# Patient Record
Sex: MALE | Race: WHITE | Employment: PART TIME | URBAN - METROPOLITAN AREA
[De-identification: names, ages, dates, MRNs, and addresses within clinical notes are randomized per-mention and may not be internally consistent; named-entity substitution may affect disease eponyms.]

---
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
## 2023-08-31 PROBLEM — F32.1 MODERATE SINGLE CURRENT EPISODE OF MAJOR DEPRESSIVE DISORDER (MULTI): Status: ACTIVE | Noted: 2023-08-31

## 2023-08-31 PROBLEM — F41.1 GENERALIZED ANXIETY DISORDER: Status: ACTIVE | Noted: 2023-08-31

## 2023-08-31 PROBLEM — F95.9 TIC DISORDER: Status: ACTIVE | Noted: 2023-08-31

## 2023-08-31 PROBLEM — F90.2 ATTENTION DEFICIT HYPERACTIVITY DISORDER (ADHD), COMBINED TYPE: Status: ACTIVE | Noted: 2023-08-31

## 2023-08-31 RX ORDER — FLUOXETINE 10 MG/1
TABLET ORAL DAILY
COMMUNITY
Start: 2017-07-31 | End: 2023-10-09 | Stop reason: SDUPTHER

## 2023-08-31 RX ORDER — GUANFACINE 2 MG/1
1 TABLET, EXTENDED RELEASE ORAL DAILY
COMMUNITY
Start: 2022-08-26 | End: 2023-10-09 | Stop reason: SDUPTHER

## 2023-09-05 ENCOUNTER — HOSPITAL ENCOUNTER (EMERGENCY)
Age: 18
Discharge: HOME | End: 2023-09-05
Attending: EMERGENCY MEDICINE
Payer: COMMERCIAL

## 2023-09-05 VITALS
TEMPERATURE: 98.2 F | HEIGHT: 78 IN | DIASTOLIC BLOOD PRESSURE: 61 MMHG | HEART RATE: 84 BPM | SYSTOLIC BLOOD PRESSURE: 106 MMHG | BODY MASS INDEX: 27.88 KG/M2 | WEIGHT: 241 LBS | OXYGEN SATURATION: 100 % | RESPIRATION RATE: 18 BRPM

## 2023-09-05 DIAGNOSIS — R42 DIZZINESS: Primary | ICD-10-CM

## 2023-09-05 LAB
CHP ED QC CHECK: NORMAL
GLUCOSE BLD-MCNC: 94 MG/DL
GLUCOSE BLD-MCNC: 94 MG/DL (ref 70–99)
PERFORMED ON: NORMAL

## 2023-09-05 PROCEDURE — 99284 EMERGENCY DEPT VISIT MOD MDM: CPT

## 2023-09-05 PROCEDURE — 93005 ELECTROCARDIOGRAM TRACING: CPT | Performed by: EMERGENCY MEDICINE

## 2023-09-05 PROCEDURE — 0202U NFCT DS 22 TRGT SARS-COV-2: CPT

## 2023-09-05 RX ORDER — 0.9 % SODIUM CHLORIDE 0.9 %
1000 INTRAVENOUS SOLUTION INTRAVENOUS ONCE
Status: DISCONTINUED | OUTPATIENT
Start: 2023-09-05 | End: 2023-09-06 | Stop reason: HOSPADM

## 2023-09-05 ASSESSMENT — ENCOUNTER SYMPTOMS
ABDOMINAL PAIN: 0
COUGH: 0
PHOTOPHOBIA: 0
SHORTNESS OF BREATH: 0
VOMITING: 0

## 2023-09-06 LAB
B PARAP IS1001 DNA NPH QL NAA+NON-PROBE: NOT DETECTED
B PERT.PT PRMT NPH QL NAA+NON-PROBE: NOT DETECTED
C PNEUM DNA NPH QL NAA+NON-PROBE: NOT DETECTED
EKG ATRIAL RATE: 83 BPM
EKG P AXIS: 43 DEGREES
EKG P-R INTERVAL: 172 MS
EKG Q-T INTERVAL: 372 MS
EKG QRS DURATION: 104 MS
EKG QTC CALCULATION (BAZETT): 437 MS
EKG R AXIS: 11 DEGREES
EKG T AXIS: 46 DEGREES
EKG VENTRICULAR RATE: 83 BPM
FLUAV H1 2009 PAN RNA NPH NAA+NON-PROBE: DETECTED
FLUBV RNA NPH QL NAA+NON-PROBE: NOT DETECTED
HADV DNA NPH QL NAA+NON-PROBE: NOT DETECTED
HCOV 229E RNA NPH QL NAA+NON-PROBE: NOT DETECTED
HCOV HKU1 RNA NPH QL NAA+NON-PROBE: NOT DETECTED
HCOV NL63 RNA NPH QL NAA+NON-PROBE: NOT DETECTED
HCOV OC43 RNA NPH QL NAA+NON-PROBE: NOT DETECTED
HMPV RNA NPH QL NAA+NON-PROBE: NOT DETECTED
HPIV1 RNA NPH QL NAA+NON-PROBE: NOT DETECTED
HPIV2 RNA NPH QL NAA+NON-PROBE: NOT DETECTED
HPIV3 RNA NPH QL NAA+NON-PROBE: NOT DETECTED
HPIV4 RNA NPH QL NAA+NON-PROBE: NOT DETECTED
M PNEUMO DNA NPH QL NAA+NON-PROBE: NOT DETECTED
RSV RNA NPH QL NAA+NON-PROBE: NOT DETECTED
RV+EV RNA NPH QL NAA+NON-PROBE: NOT DETECTED
SARS-COV-2 RNA NPH QL NAA+NON-PROBE: NOT DETECTED

## 2023-09-06 NOTE — ED NOTES
Patient D/C instructions have been reviewed, patient is to follow up with PCP   Patient encouraged to return with any worsening S/S  Patient voiced understanding, no questions or concerns noted at this time.        Danielle Roy, [address]  09/05/23 1892

## 2023-09-06 NOTE — ED NOTES
Spoke with patient and parent at the bedside, patient report that he is feeling better   Patient report he does not feel that he will need the lab work at this time  Patient report that he will return with any worsening S/S  Patient parent report that she will monitor patient throughout the night     Ed Franklyn, [address]  09/05/23 3784

## 2023-09-06 NOTE — ED PROVIDER NOTES
[facility] ED  EMERGENCY DEPARTMENT ENCOUNTER      Pt Name: Jayme Leigh  MRN: [id number]  [address] 2005  Date of evaluation: 9/5/2023  Provider: Denver Irene MD    CHIEF COMPLAINT       Chief Complaint   Patient presents with    Dizziness     Started around [phone], Dizziness and weakness states hasn't happened before, denies N/V, denies CP, SOB, recently diagnosed with strep throat didn't test positive for it but being treated for is on antibiotics mom states he is getting worse         HISTORY OF PRESENT ILLNESS   (Location/Symptom, Timing/Onset, Context/Setting, Quality, Duration, Modifying Factors, Severity)  Note limiting factors. Jayme Leigh is a 21 y.o. male who presents to the emergency department for evaluation via private vehicle. He states that 3 weeks ago he had a sore throat after going to [facility]. That he had 2 subsequent negative COVID and strep test.  He was started on cefdinir yesterday and took a dose this morning. Says that while he was driving around 6033 he had an episode of general dizziness. This resolved prior to arrival.  He had no associated fever, headache or vision change, neck pain or stiffness, chest pain or difficulty breathing, vomiting or diaphoresis, abdominal pain, back pain, numbness or focal weakness. He states \" I feel fine now\"    HPI  Chart review notes previous Prozac prescription  Nursing Notes were reviewed. REVIEW OF SYSTEMS    (2-9 systems for level 4, 10 or more for level 5)     Review of Systems   Constitutional:  Negative for fever. Eyes:  Negative for photophobia and visual disturbance. Respiratory:  Negative for cough and shortness of breath. Cardiovascular:  Negative for chest pain. Gastrointestinal:  Negative for abdominal pain and vomiting. Neurological:  Positive for dizziness and light-headedness. Negative for syncope, facial asymmetry, speech difficulty, weakness, numbness and headaches.         Resolved   All other systems

## 2023-10-09 ENCOUNTER — TELEMEDICINE (OUTPATIENT)
Dept: BEHAVIORAL HEALTH | Facility: CLINIC | Age: 18
End: 2023-10-09
Payer: COMMERCIAL

## 2023-10-09 DIAGNOSIS — F33.0 MILD EPISODE OF RECURRENT MAJOR DEPRESSIVE DISORDER (CMS-HCC): ICD-10-CM

## 2023-10-09 DIAGNOSIS — F95.9 TIC DISORDER: ICD-10-CM

## 2023-10-09 DIAGNOSIS — F41.1 GAD (GENERALIZED ANXIETY DISORDER): ICD-10-CM

## 2023-10-09 DIAGNOSIS — F90.0 ATTENTION DEFICIT HYPERACTIVITY DISORDER (ADHD), PREDOMINANTLY INATTENTIVE TYPE: ICD-10-CM

## 2023-10-09 PROCEDURE — 99213 OFFICE O/P EST LOW 20 MIN: CPT | Performed by: CLINICAL NURSE SPECIALIST

## 2023-10-09 RX ORDER — FLUOXETINE 10 MG/1
15 TABLET ORAL DAILY
Qty: 45 TABLET | Refills: 5 | Status: SHIPPED | OUTPATIENT
Start: 2023-10-09 | End: 2023-12-12

## 2023-10-09 RX ORDER — GUANFACINE 2 MG/1
2 TABLET, EXTENDED RELEASE ORAL DAILY
Qty: 30 TABLET | Refills: 5 | Status: SHIPPED | OUTPATIENT
Start: 2023-10-09 | End: 2024-02-05 | Stop reason: SDUPTHER

## 2023-10-09 NOTE — PROGRESS NOTES
Outpatient Child and Adolescent Psychiatry      Treatment Plan/Recommendations:   Continue Prozac 10 mg 1 1/2 daily - anxiety, depression   Continue Intuniv 2 mg 1 daily - tics, ADHD   See me in Feb   Please call with questions, concerns   Kelechi in agreement     Provider Impression:   Anxiety, depression - exacerbation of symptoms a few months ago, but improving at this time, would continue current medication, use support system and safety plan, consider therapy if needed   ADHD, Tic - stable with Intuniv     Diagnosis:   Patient Active Problem List   Diagnosis    Generalized anxiety disorder    Attention deficit hyperactivity disorder (ADHD), combined type    Moderate single current episode of major depressive disorder (CMS/HCC)    Tic disorder       Reason for Visit:       HPI: Kelechi is a 17 y.o male who lives with mother, step-father, his younger brother.   Last seen in June   Continues on Prozac 15 mg for anxiety, depression, Intuniv 2 mg for tics. ADHD s/s.   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   He had increase depression, anxiety symptoms in July and August following break up.  Tried dating again but did not work out. [employer] lost about 40 lbs due to depression, has gained some back, now at 250 lbs and just working on being healthy and at this weight. When more depressed tried to stay busy with friends, school, work, and exercise as when alone and at night symptoms were worse.  Would take melatonin to sleep.   did get to the point he had SI and thought to self harm with a knife, but did not act on it.  Got back together with ex girlfriend end of August,  has been better since then.  [facility] [facility] she moved out of parent's house, in a more positive setting now and they are both supportive of each other. Feels in a good place right now, happy and feeling fortunate.  [employer] school program is  stressful, but learning a lot and remains focused. Sleeping better, on a good schedule.  No current SI, no panic attacks ,no self harm. Was in ER a month ago, dizzy and weak, may have had mono.  No other illness.    No substance use   Tics not significant       Current Medications:    Current Outpatient Medications:     FLUoxetine (PROzac) 10 mg tablet, Take by mouth once daily., Disp: , Rfl:     guanFACINE (Intuniv) 2 mg 24 hr tablet, Take 1 tablet (2 mg) by mouth once daily., Disp: , Rfl:     Record Review: reviewed past notes      Review of Systems     Psychiatric Review Of Systems:  Depressive Symptoms: see HPI  Manic Symptoms:  no symptoms reported   Anxiety Symptoms:  see HPI  Disordered Eating Symptoms: no symptoms reported   Inattentive Symptoms: stable with medication    Hyperactive/Impulsive Symptoms: no symptoms reported   Oppositional Defiant Symptoms: no symptoms reported   Trauma Symptoms: no symptoms reported   Conduct Issues: no symptoms reported   Psychotic Symptoms: no symptoms reported   Developmental Concerns: n/a        Medical Review Of Systems:  Constitutional: normal sleeping and normal appetite. , weight loss but now maintaining   Eyes: does not wear glasses/contacts.   Cardiovascular: no chest pain and no palpitations.   Respiratory: no asthma/reactive airway disease.   Gastrointestinal: no abdominal pain and no reflux.   Neurological: tics and or twitches, but no headache, no head injury and no seizures.    Was sick last month and in ER, may have been mono, no other illness     No family history on file.   No past medical history on file.       Objective   Mental Status Exam:     See screenings     Papito Shields, APRN-CNS

## 2024-02-05 ENCOUNTER — TELEMEDICINE (OUTPATIENT)
Dept: BEHAVIORAL HEALTH | Facility: CLINIC | Age: 19
End: 2024-02-05
Payer: COMMERCIAL

## 2024-02-05 DIAGNOSIS — F33.0 MILD EPISODE OF RECURRENT MAJOR DEPRESSIVE DISORDER (CMS-HCC): ICD-10-CM

## 2024-02-05 DIAGNOSIS — F95.9 TIC DISORDER: ICD-10-CM

## 2024-02-05 DIAGNOSIS — F41.1 GAD (GENERALIZED ANXIETY DISORDER): ICD-10-CM

## 2024-02-05 DIAGNOSIS — F90.0 ATTENTION DEFICIT HYPERACTIVITY DISORDER (ADHD), PREDOMINANTLY INATTENTIVE TYPE: ICD-10-CM

## 2024-02-05 PROCEDURE — 99213 OFFICE O/P EST LOW 20 MIN: CPT | Performed by: CLINICAL NURSE SPECIALIST

## 2024-02-05 RX ORDER — GUANFACINE 2 MG/1
2 TABLET, EXTENDED RELEASE ORAL DAILY
Qty: 90 TABLET | Refills: 1 | Status: SHIPPED | OUTPATIENT
Start: 2024-02-05 | End: 2024-08-03

## 2024-02-05 RX ORDER — FLUOXETINE 10 MG/1
15 TABLET ORAL DAILY
Qty: 135 TABLET | Refills: 1 | Status: SHIPPED | OUTPATIENT
Start: 2024-02-05

## 2024-02-05 NOTE — PROGRESS NOTES
Outpatient Child and Adolescent Psychiatry      Treatment Plan/Recommendations:   Continue Prozac 10 mg 1 1/2 daily - anxiety, depression   Continue Intuniv 2 mg 1 daily - tics, ADHD   See me in June  Please call with questions, concerns   Kelechi in agreement     Provider Impression:   Anxiety, mood - stable   ADHD, Tic - stable with Intuniv     Diagnosis:   Patient Active Problem List   Diagnosis    Generalized anxiety disorder    Attention deficit hyperactivity disorder (ADHD), combined type    Moderate single current episode of major depressive disorder (CMS/HCC)    Tic disorder       Reason for Visit:   Anxiety, mood, tics , ADHD    HPI: Keelchi is a 18 y.o male who lives with mother, step-father, his younger brother.   Last seen in October  Continues on Prozac 15 mg for anxiety, depression, Intuniv 2 mg for tics. ADHD s/s.   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  His job is going well.  He talked about witnessing a few situations that had an impact on him, 2 codes in particular.  Did not have to participate but feels it had to he would have been ok, just anxiety provoking to see at first.  Does feel his job has helped him feel more comfortable.  He is going through EMS program again, did not get score he needed to pass first time around.  He is ok with this.  Relationship with girlfriend has been positive.  Mood overall has been good, denies depression symptoms, no reported thoughts of death or self harm.  Weight stable, 250 lbs.  Sleeping ok, tends to stay up late.  No tics.  No substance use.  No further dizziness and has been healthy.        Current Medications:    Current Outpatient Medications:     FLUoxetine (PROzac) 10 mg tablet, Take by mouth once daily., Disp: , Rfl:     guanFACINE (Intuniv) 2 mg 24 hr tablet, Take 1 tablet (2 mg) by mouth once daily., Disp: , Rfl:        Review of Systems     Psychiatric Review Of Systems:  Depressive  Symptoms: see HPI  Manic Symptoms:  no symptoms reported   Anxiety Symptoms:  some anxiety in work setting, but overall has been stable   Disordered Eating Symptoms: no symptoms reported   Inattentive Symptoms: stable with medication    Hyperactive/Impulsive Symptoms: no symptoms reported   Oppositional Defiant Symptoms: no symptoms reported   Trauma Symptoms: no symptoms reported   Conduct Issues: no symptoms reported   Psychotic Symptoms: no symptoms reported   Tics - stable         Medical Review Of Systems:  Constitutional: normal sleeping and normal appetite. , weight stable   Eyes: does not wear glasses/contacts.   Cardiovascular: no chest pain and no palpitations.   Respiratory: no asthma/reactive airway disease.   Gastrointestinal: no abdominal pain and no reflux.   Neurological: no headache, no head injury and no seizures.  No recent tics       No family history on file.   No past medical history on file.       Objective   Mental Status Exam:     See screenings     Papito Shields, APRN-CNS

## 2024-03-03 ENCOUNTER — HOSPITAL ENCOUNTER (EMERGENCY)
Facility: HOSPITAL | Age: 19
Discharge: HOME | End: 2024-03-03
Attending: STUDENT IN AN ORGANIZED HEALTH CARE EDUCATION/TRAINING PROGRAM
Payer: COMMERCIAL

## 2024-03-03 VITALS
RESPIRATION RATE: 16 BRPM | SYSTOLIC BLOOD PRESSURE: 133 MMHG | WEIGHT: 250 LBS | TEMPERATURE: 99.9 F | HEART RATE: 81 BPM | DIASTOLIC BLOOD PRESSURE: 64 MMHG | OXYGEN SATURATION: 99 % | BODY MASS INDEX: 28.93 KG/M2 | HEIGHT: 78 IN

## 2024-03-03 DIAGNOSIS — K08.89 PAIN, DENTAL: Primary | ICD-10-CM

## 2024-03-03 PROCEDURE — 99283 EMERGENCY DEPT VISIT LOW MDM: CPT | Performed by: STUDENT IN AN ORGANIZED HEALTH CARE EDUCATION/TRAINING PROGRAM

## 2024-03-03 PROCEDURE — 2500000001 HC RX 250 WO HCPCS SELF ADMINISTERED DRUGS (ALT 637 FOR MEDICARE OP)

## 2024-03-03 PROCEDURE — 99283 EMERGENCY DEPT VISIT LOW MDM: CPT

## 2024-03-03 RX ORDER — IBUPROFEN 600 MG/1
600 TABLET ORAL ONCE
Status: COMPLETED | OUTPATIENT
Start: 2024-03-03 | End: 2024-03-03

## 2024-03-03 RX ORDER — CLINDAMYCIN HYDROCHLORIDE 150 MG/1
450 CAPSULE ORAL ONCE
Status: COMPLETED | OUTPATIENT
Start: 2024-03-03 | End: 2024-03-03

## 2024-03-03 RX ADMIN — IBUPROFEN 600 MG: 600 TABLET, FILM COATED ORAL at 22:29

## 2024-03-03 RX ADMIN — CLINDAMYCIN HYDROCHLORIDE 450 MG: 150 CAPSULE ORAL at 23:11

## 2024-03-03 ASSESSMENT — PAIN - FUNCTIONAL ASSESSMENT
PAIN_FUNCTIONAL_ASSESSMENT: 0-10

## 2024-03-03 ASSESSMENT — COLUMBIA-SUICIDE SEVERITY RATING SCALE - C-SSRS
6. HAVE YOU EVER DONE ANYTHING, STARTED TO DO ANYTHING, OR PREPARED TO DO ANYTHING TO END YOUR LIFE?: NO
1. IN THE PAST MONTH, HAVE YOU WISHED YOU WERE DEAD OR WISHED YOU COULD GO TO SLEEP AND NOT WAKE UP?: NO
2. HAVE YOU ACTUALLY HAD ANY THOUGHTS OF KILLING YOURSELF?: NO

## 2024-03-03 ASSESSMENT — PAIN DESCRIPTION - LOCATION: LOCATION: JAW

## 2024-03-03 ASSESSMENT — PAIN SCALES - GENERAL
PAINLEVEL_OUTOF10: 2
PAINLEVEL_OUTOF10: 7
PAINLEVEL_OUTOF10: 7

## 2024-03-04 NOTE — ED PROVIDER NOTES
HPI   Chief Complaint   Patient presents with    Dental Pain    Oral Swelling     Pt c/o right sided jaw/tooth pain on Wednesday. States cheek swelling started today and the pain is progressively getting worse.        Patient is a 18-year-old male with no segment past medical history presenting to [facility] ED for right sided dental pain/swelling.  Patient reports that he was last at the dentist for cavity repair on 2/14.  Patient reports that his recent pain and swelling started on Wednesday.  Patient coming into ED today, due to worsening pain over the course of the week since Wednesday.  Patient denies any recent fever, chills.  Patient denies any other related symptoms.  Patient did not have any antibiotic course following cavity repair on 2/14.                          [place] Coma Scale Score: 15                     Patient History   No past medical history on file.  No past surgical history on file.  No family history on file.  Social History     Tobacco Use    Smoking status: Never    Smokeless tobacco: Never   Substance Use Topics    Alcohol use: Not on file    Drug use: Not on file       Physical Exam   ED Triage Vitals [03/03/24 2054]   Temperature Heart Rate Respirations BP   37.7 °C (99.9 °F) 99 18 153/82      Pulse Ox Temp Source Heart Rate Source Patient Position   96 % Temporal Monitor Sitting      BP Location FiO2 (%)     Right arm --       Physical Exam  Constitutional:       Appearance: Normal appearance. He is normal weight.   HENT:      Head: Normocephalic and atraumatic.      Comments: Tenderness/swelling over the right inferior orbit.     Nose: Nose normal.      Mouth/Throat:      Mouth: Mucous membranes are moist.      Pharynx: Oropharynx is clear.      Comments: Tenderness at the right upper gumline.  Eyes:      Extraocular Movements: Extraocular movements intact.      Conjunctiva/sclera: Conjunctivae normal.      Pupils: Pupils are equal, round, and reactive to light.   Cardiovascular:       Rate and Rhythm: Normal rate and regular rhythm.      Pulses: Normal pulses.      Heart sounds: Normal heart sounds.   Pulmonary:      Effort: Pulmonary effort is normal.      Breath sounds: Normal breath sounds.   Abdominal:      General: Abdomen is flat. Bowel sounds are normal.      Palpations: Abdomen is soft.   Musculoskeletal:         General: Normal range of motion.      Cervical back: Normal range of motion and neck supple.   Skin:     General: Skin is warm and dry.      Capillary Refill: Capillary refill takes less than 2 seconds.   Neurological:      General: No focal deficit present.      Mental Status: He is alert and oriented to person, place, and time. Mental status is at baseline.   Psychiatric:         Mood and Affect: Mood normal.         Behavior: Behavior normal.         ED Course & MDM   Diagnoses as of 03/03/24 0517   Pain, dental       Medical Decision Making  Patient is a 18 y.o. male who presents to [facility] ED for Dental Pain and Oral Swelling (Pt c/o right sided jaw/tooth pain on Wednesday. States cheek swelling started today and the pain is progressively getting worse. ). On initial ED evaluation, patient found to be in no acute distress. Per HPI, concern to evaluate and treat for dental pain.  Administered dose of Motrin in the ED for facial pain and swelling.  Patient given 1 dose of clindamycin for oral antibiotic coverage.  Recommend supportive care to patient.  Advised to take Motrin/Tylenol as needed for pain and swelling.  Patient to follow-up with dentist.    Patient to follow up with PCP outpatient. Anticipatory guidance and return precautions provided.  Patient otherwise stable for discharge.          Procedure  Procedures     Meryl Sandoval MD  Resident  03/03/24 8932

## 2024-03-04 NOTE — DISCHARGE INSTRUCTIONS
Please return to closest ED if develop any difficulty in breathing, feel like your throat is closing, have worsening numbness or tingling in your cheek, or significant bleeding or discharge from the mouth.

## 2024-06-03 ENCOUNTER — TELEMEDICINE (OUTPATIENT)
Dept: BEHAVIORAL HEALTH | Facility: CLINIC | Age: 19
End: 2024-06-03
Payer: COMMERCIAL

## 2024-06-03 DIAGNOSIS — F90.0 ATTENTION DEFICIT HYPERACTIVITY DISORDER (ADHD), PREDOMINANTLY INATTENTIVE TYPE: ICD-10-CM

## 2024-06-03 DIAGNOSIS — F95.9 TIC DISORDER: ICD-10-CM

## 2024-06-03 DIAGNOSIS — F33.0 MILD EPISODE OF RECURRENT MAJOR DEPRESSIVE DISORDER (CMS-HCC): ICD-10-CM

## 2024-06-03 DIAGNOSIS — F41.1 GAD (GENERALIZED ANXIETY DISORDER): ICD-10-CM

## 2024-06-03 PROCEDURE — 1036F TOBACCO NON-USER: CPT | Performed by: CLINICAL NURSE SPECIALIST

## 2024-06-03 PROCEDURE — 99214 OFFICE O/P EST MOD 30 MIN: CPT | Performed by: CLINICAL NURSE SPECIALIST

## 2024-06-03 NOTE — PROGRESS NOTES
Outpatient Child and Adolescent Psychiatry      Treatment Plan/Recommendations:   Continue Prozac 10 mg 1 1/2 daily - anxiety, depression   Continue Intuniv 2 mg 1 daily - tics, ADHD   Reviewed other medications options to augment but Kelechi feels he is stable and able to use coming strategies at this time   See me in Aug  Continue with effective communication and safety interventions   Please call with questions, concerns   Kelechi in agreement     Provider Impression:  Anxiety, mood - fluctuating stressors impacting mood, anxiety, recent passive thoughts of death, able to reach out for support  Does not want to add medication at this time   ADHD, Tic - stable with Intuniv     Diagnosis:   Patient Active Problem List   Diagnosis    Generalized anxiety disorder    Attention deficit hyperactivity disorder (ADHD), combined type    Moderate single current episode of major depressive disorder (CMS/HCC)    Tic disorder       Reason for Visit:   Anxiety, mood, tics , ADHD    HPI: Kelechi is a 18 y.o male who lives with mother, step-father, his younger brother.   Last seen in Feb   Continues on Prozac 15 mg for anxiety, depression (felt worse in the past when dose increased), Intuniv 2 mg for tics. ADHD s/s.   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  States work can be stressful because of a coworker who was starting a conflict.  He has been minimizing interactions with this person.  He is not pursuing EMS program, felt it was too difficult. He is going back to [facility] [employer] [facility] [employer] to take classes and figure out what he wants to do.  He is still dating his girlfriend which has been two years and still positive relationship.  He is going to his aunt's wedding this month and in July looking forward to a vacation for his girlfriend's birthday in [place] [facility] with friends.  Kelechi states a lot of things have happened that have impacted his mood and stress level.  His  grandfather had a routine procedure and had complications which was scary.  His work situation has stressed him out. His two grandmother's are in the hospital.  He feels he has a pretty good ability to manage these situations but at times can over think about the bad situations going on in his life.  He had a small episode a few days ago of wanting to die, says it was all the stress building up and wondered if it would ever go away.  States it was more passive SI, no specific plan, no self harm. He went to his girlfriend who supported him.  He has been taking his medications daily.  He feels ok about his medication right now and does not want to change anything.  No substance use.    Tics - not really occur often unless stress build up may have motor tic   ADHD s/s stable   He is eating and sleeping ok   No dizziness   He is sexually active, does use protection       Current Medications:    Current Outpatient Medications:     FLUoxetine (PROzac) 10 mg tablet, Take by mouth once daily., Disp: , Rfl:     guanFACINE (Intuniv) 2 mg 24 hr tablet, Take 1 tablet (2 mg) by mouth once daily., Disp: , Rfl:        Review of Systems     Psychiatric Review Of Systems:  Depressive Symptoms: see HPI  Manic Symptoms:  no symptoms reported   Anxiety Symptoms:  see HPI  Disordered Eating Symptoms: no symptoms reported   Inattentive Symptoms: stable with medication    Hyperactive/Impulsive Symptoms: no symptoms reported   Oppositional Defiant Symptoms: no symptoms reported   Trauma Symptoms: no symptoms reported   Conduct Issues: no symptoms reported   Psychotic Symptoms: no symptoms reported   Tics - see HPI        Medical Review Of Systems:  Constitutional: normal sleeping and normal appetite. , weight stable   Eyes: does not wear glasses/contacts.   Cardiovascular: no chest pain and no palpitations.   Respiratory: no asthma/reactive airway disease.   Gastrointestinal: no abdominal pain and no reflux.   Neurological: no headache, no  head injury and no seizures.  Tics intermittent       No family history on file.   No past medical history on file.       Objective   Mental Status Exam:     See screenings     Papito Shields, KULWANT-CNS

## 2024-07-15 ENCOUNTER — TELEPHONE (OUTPATIENT)
Dept: BEHAVIORAL HEALTH | Facility: HOSPITAL | Age: 19
End: 2024-07-15
Payer: COMMERCIAL

## 2024-07-16 NOTE — TELEPHONE ENCOUNTER
Mom called in to answering service due to concern that patient's depression has been worsening for the past week or so. Mom wondering if patient's medications need to be adjusted.    Spoke with Mom via phone. She states that patient reported feeling more overwhelmed, down, and apathetic about life. Mom asks whether his antidepressant should be increased. Mom confirmed that patient denied suicidal thoughts, but notes he also struggled to identify reasons to live. Of note, patient is not currently in therapy. Mom is agreeable to working on getting him into therapy. Patient's next psychiatry appointment is scheduled for 8/5/24. Mom expressed relief that the appointment is sooner than she initially thought.    No medication adjustment advised overnight. Informed Mom that her question regarding possible medication adjustment will be passed on to patient's usual psychiatric NP, Papito Shields. Mom expressed understanding.    Adarsh Bledsoe MD  Psychiatry Fellow, PGY-5

## 2024-08-05 ENCOUNTER — APPOINTMENT (OUTPATIENT)
Dept: BEHAVIORAL HEALTH | Facility: CLINIC | Age: 19
End: 2024-08-05
Payer: COMMERCIAL

## 2024-08-05 DIAGNOSIS — F95.9 TIC DISORDER: ICD-10-CM

## 2024-08-05 DIAGNOSIS — F41.1 GAD (GENERALIZED ANXIETY DISORDER): ICD-10-CM

## 2024-08-05 DIAGNOSIS — F90.0 ATTENTION DEFICIT HYPERACTIVITY DISORDER (ADHD), PREDOMINANTLY INATTENTIVE TYPE: ICD-10-CM

## 2024-08-05 DIAGNOSIS — F33.0 MILD EPISODE OF RECURRENT MAJOR DEPRESSIVE DISORDER (CMS-HCC): ICD-10-CM

## 2024-08-05 PROCEDURE — 99214 OFFICE O/P EST MOD 30 MIN: CPT | Performed by: CLINICAL NURSE SPECIALIST

## 2024-08-05 PROCEDURE — 1036F TOBACCO NON-USER: CPT | Performed by: CLINICAL NURSE SPECIALIST

## 2024-08-05 RX ORDER — FLUOXETINE 10 MG/1
15 TABLET ORAL DAILY
Qty: 135 TABLET | Refills: 1 | Status: SHIPPED | OUTPATIENT
Start: 2024-08-05

## 2024-08-05 RX ORDER — GUANFACINE 2 MG/1
2 TABLET, EXTENDED RELEASE ORAL DAILY
Qty: 90 TABLET | Refills: 1 | Status: SHIPPED | OUTPATIENT
Start: 2024-08-05 | End: 2025-02-01

## 2024-08-05 NOTE — PROGRESS NOTES
Outpatient Child and Adolescent Psychiatry      Treatment Plan/Recommendations:   Continue Prozac 10 mg 1 1/2 daily - anxiety, depression   Continue Intuniv 2 mg 1 daily - tics, ADHD   Reviewed again other medications options due to pattern of anxiety, depression symptoms, but Kelechi resistant to changes at this time, feels he is doing better now compared to weeks ago   Strongly recommend therapy or [facility] group - sent flyer  Reviewed APRN leaving , discussion transition options - will further discuss next apt in Sept  Continue with effective communication and safety interventions, please notify mother or another adult if not able to maintain safety, or go to ER   See me 4 weeks   Kelechi in agreement     Provider Impression:  Anxiety, mood - fluctuating stressors continue to impact mood, anxiety, recent SI, reports currently no SI and that his symptoms have improved.  Kelechi has been resistant to medication changes or therapy however my recommendation is he should be in therapy or attend [facility] workshop due to persistent pattern of increase anxiety and depression symptoms, would benefit from working of more effective coping and communication skills   I would also consider medication changes if symptoms persist   ADHD - difficulty to manage more then part time work and part time school, does not want consider other medication at this time   Tics - stable with intuniv     Diagnosis:   Patient Active Problem List   Diagnosis    Generalized anxiety disorder    Attention deficit hyperactivity disorder (ADHD), combined type    Moderate single current episode of major depressive disorder (CMS/HCC)    Tic disorder       Reason for Visit:  Anxiety, mood, tics , ADHD  Virtual or Telephone Consent    An interactive audio and video telecommunication system which permits real time communications between the patient (at the originating site) and provider (at the distant site) was utilized to provide this telehealth service.   Verbal  consent was requested and obtained from Kelechi Quintanilla on this date, 08/05/24 for a telehealth visit.      Kelechi gives permission for mother to participate in appointment       HPI: eKlechi is a 18 y.o male who lives with mother, step-father, his younger brother.   Last seen 2 months ago, taking prozac 15 mg daily (felt worse when dose increased in the past). Intuniv 2 mg daily for tics, ADHD, no side effects    Kelechi states a few weeks ago he was depressed and suicidal.  He had a plan to drive and hit a semi truck. He states it was more of a thought and he never acted on it.  He states his mood just got progressively worse after last apt.  States going away on vacation last month really helped and he sorted things out in his mind. He feels he was able to sort out his bad depression issues and suicidal thoughts and he has not felt that way since.  States it was a lot of things building up, some family members sick (says his grandparents were really sick for a while).    States he had a stressful situation with his girlfriend a few months ago he never told anyone about.  He finally told his mother.  He says they are still seeing each other.  He states she is on medication now.  Mother does not feel it is a healthy relationship.  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  He is going to take a class one class in the fall. He realizes when he takes more then one class he gets over loaded.  He is not sure what he wants to do in his future.  Denies substance use.  Tics not often unless build up of stress, may have a motor tic. Eating and sleeping ok.  Not sure exact weight   He feels now he is in a good spot and does not feel he needs therapy, feels therapy is more effective in times of crisis.  States his medication is fine right now and it was just situational issues,   Mother states Kelechi is minimizing issues.  She states he has issues with his father he is not bringing up.  She has concerns  about his relationship with his girlfriend.  She states today he is saying the trip he took helped so much but states he came home and was so upset, saying he was thinking about wanting to die which is why she called the office.  States she knows he is an adult and has to be in agreement with therapy and medication changes, just feels he is not being honest.        Current Medications:    Current Outpatient Medications:     FLUoxetine (PROzac) 10 mg tablet, Take by mouth once daily., Disp: , Rfl:     guanFACINE (Intuniv) 2 mg 24 hr tablet, Take 1 tablet (2 mg) by mouth once daily., Disp: , Rfl:        Review of Systems     Psychiatric Review Of Systems:  Depressive Symptoms: see HPI  Manic Symptoms:  no symptoms reported   Anxiety Symptoms:  see HPI  Disordered Eating Symptoms: no symptoms reported   Inattentive Symptoms:  trouble to manage more then one class with part time work   Hyperactive/Impulsive Symptoms: no symptoms reported   Oppositional Defiant Symptoms: no symptoms reported   Trauma Symptoms: no symptoms reported   Conduct Issues: no symptoms reported   Psychotic Symptoms: no symptoms reported   Tics - see HPI        Medical Review Of Systems:  Constitutional: normal sleeping and normal appetite.  Not sure exact weight today   Eyes: does not wear glasses/contacts.   Cardiovascular: no chest pain and no palpitations.   Respiratory: no asthma/reactive airway disease.   Gastrointestinal: no abdominal pain and no reflux.   Neurological: no headache, no head injury and no seizures.  Tics intermittent       No family history on file.   No past medical history on file.       Objective   Mental Status Exam:     See screenings     KULWANT Sharpe-CNS

## 2024-09-05 ENCOUNTER — APPOINTMENT (OUTPATIENT)
Dept: BEHAVIORAL HEALTH | Facility: CLINIC | Age: 19
End: 2024-09-05
Payer: COMMERCIAL

## 2024-09-05 DIAGNOSIS — F95.9 TIC DISORDER: ICD-10-CM

## 2024-09-05 DIAGNOSIS — F41.1 GAD (GENERALIZED ANXIETY DISORDER): ICD-10-CM

## 2024-09-05 DIAGNOSIS — F90.0 ATTENTION DEFICIT HYPERACTIVITY DISORDER (ADHD), PREDOMINANTLY INATTENTIVE TYPE: ICD-10-CM

## 2024-09-05 DIAGNOSIS — F33.0 MILD EPISODE OF RECURRENT MAJOR DEPRESSIVE DISORDER (CMS-HCC): ICD-10-CM

## 2024-09-05 PROCEDURE — 99214 OFFICE O/P EST MOD 30 MIN: CPT | Performed by: CLINICAL NURSE SPECIALIST

## 2024-09-05 PROCEDURE — 1036F TOBACCO NON-USER: CPT | Performed by: CLINICAL NURSE SPECIALIST

## 2024-09-05 NOTE — PROGRESS NOTES
Outpatient Child and Adolescent Psychiatry      Treatment Plan/Recommendations:   Continue Prozac 10 mg 1 1/2 daily - anxiety, depression   Continue Intuniv 2 mg 1 daily - tics, ADHD   Start therapy   Reviewed Kelechi León in agreement to see provider at [facility], provided informaiton   Continue with effective communication and safety interventions, please notify mother or another adult if not able to maintain safety, or go to ER   See me 8 weeks   Kelechi in agreement     Provider Impression:  Anxiety, mood - improvement past month, mood and anxiety symptoms stable would continue medication and start therapy    I would also consider medication changes if symptoms increase again   ADHD - able to manage part time work and part time school, does not want consider other medication at this time   Tics - stable with intuniv     Diagnosis:   Patient Active Problem List   Diagnosis    Generalized anxiety disorder    Attention deficit hyperactivity disorder (ADHD), combined type    Moderate single current episode of major depressive disorder (CMS/HCC)    Tic disorder       Reason for Visit:  Anxiety, mood, tics , ADHD  Virtual or Telephone Consent    An interactive audio and video telecommunication system which permits real time communications between the patient (at the originating site) and provider (at the distant site) was utilized to provide this telehealth service.   Verbal consent was requested and obtained from Kelechi Quintanilla on this date, 09/05/24 for a telehealth visit.      Kelechi gives permission for mother to participate in appointment       HPI: Kelechi is a 18 y.o male who lives with mother, step-father, his younger brother.   Last seen 1 month ago, taking prozac 15 mg daily (felt worse when dose increased in the past). Intuniv 2 mg daily for tics, ADHD, no side effects    Kelechi states things are going very well.  He did sign up for therapy but has not started yet.  He has not had any depressive episodes  past month.  No SI or thoughts of death.  He is not sure what changed but feeling more positive. 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  His relationship is going well.  Tomorrow he is off and looking forward to resting.    Tics have not been an issue   No drinking or drug use   Eating and sleeping well       Current Medications:    Current Outpatient Medications:     FLUoxetine (PROzac) 10 mg tablet, Take by mouth once daily., Disp: , Rfl:     guanFACINE (Intuniv) 2 mg 24 hr tablet, Take 1 tablet (2 mg) by mouth once daily., Disp: , Rfl:        Review of Systems     Psychiatric Review Of Systems:  Depressive Symptoms: see HPI, denies current symptoms   Manic Symptoms:  no symptoms reported   Anxiety Symptoms:  denies significant stress right now   Disordered Eating Symptoms: no symptoms reported   Inattentive Symptoms:  trouble to manage more then one class with part time work   Hyperactive/Impulsive Symptoms: no symptoms reported   Oppositional Defiant Symptoms: no symptoms reported   Trauma Symptoms: no symptoms reported   Conduct Issues: no symptoms reported   Psychotic Symptoms: no symptoms reported   Tics - see HPI        Medical Review Of Systems:  Constitutional: normal sleeping and normal appetite.  Not sure exact weight today   Eyes: does not wear glasses/contacts.   Cardiovascular: no chest pain and no palpitations.   Respiratory: no asthma/reactive airway disease.   Gastrointestinal: no abdominal pain and no reflux.   Neurological: no headache, no head injury and no seizures.  Tics intermittent       No family history on file.   No past medical history on file.       Objective   Mental Status Exam:     See screenings     KULWANT Sharpe-CNS